# Patient Record
Sex: FEMALE | Race: OTHER | HISPANIC OR LATINO | URBAN - METROPOLITAN AREA
[De-identification: names, ages, dates, MRNs, and addresses within clinical notes are randomized per-mention and may not be internally consistent; named-entity substitution may affect disease eponyms.]

---

## 2017-02-02 VITALS
DIASTOLIC BLOOD PRESSURE: 56 MMHG | RESPIRATION RATE: 56 BRPM | HEIGHT: 61 IN | TEMPERATURE: 98 F | SYSTOLIC BLOOD PRESSURE: 91 MMHG | OXYGEN SATURATION: 99 % | WEIGHT: 130.95 LBS | HEART RATE: 91 BPM

## 2017-02-03 ENCOUNTER — INPATIENT (INPATIENT)
Facility: HOSPITAL | Age: 47
LOS: 2 days | Discharge: HOME | DRG: 743 | End: 2017-02-06
Attending: OBSTETRICS & GYNECOLOGY | Admitting: OBSTETRICS & GYNECOLOGY
Payer: COMMERCIAL

## 2017-02-03 DIAGNOSIS — Z98.82 BREAST IMPLANT STATUS: Chronic | ICD-10-CM

## 2017-02-03 LAB
BLD GP AB SCN SERPL QL: NEGATIVE — SIGNIFICANT CHANGE UP
HCT VFR BLD CALC: 37.1 % — SIGNIFICANT CHANGE UP (ref 34.5–45)
HGB BLD-MCNC: 12.1 G/DL — SIGNIFICANT CHANGE UP (ref 11.5–15.5)
MCHC RBC-ENTMCNC: 27.8 PG — SIGNIFICANT CHANGE UP (ref 27–34)
MCHC RBC-ENTMCNC: 32.6 G/DL — SIGNIFICANT CHANGE UP (ref 32–36)
MCV RBC AUTO: 85.1 FL — SIGNIFICANT CHANGE UP (ref 80–100)
PLATELET # BLD AUTO: 302 K/UL — SIGNIFICANT CHANGE UP (ref 150–400)
RBC # BLD: 4.36 M/UL — SIGNIFICANT CHANGE UP (ref 3.8–5.2)
RBC # FLD: 13.1 % — SIGNIFICANT CHANGE UP (ref 10.3–16.9)
RH IG SCN BLD-IMP: POSITIVE — SIGNIFICANT CHANGE UP
WBC # BLD: 13 K/UL — HIGH (ref 3.8–10.5)
WBC # FLD AUTO: 13 K/UL — HIGH (ref 3.8–10.5)

## 2017-02-03 RX ORDER — ONDANSETRON 8 MG/1
4 TABLET, FILM COATED ORAL EVERY 8 HOURS
Qty: 0 | Refills: 0 | Status: DISCONTINUED | OUTPATIENT
Start: 2017-02-03 | End: 2017-02-06

## 2017-02-03 RX ORDER — NALOXONE HYDROCHLORIDE 4 MG/.1ML
0.1 SPRAY NASAL
Qty: 0 | Refills: 0 | Status: DISCONTINUED | OUTPATIENT
Start: 2017-02-03 | End: 2017-02-06

## 2017-02-03 RX ORDER — METOCLOPRAMIDE HCL 10 MG
10 TABLET ORAL EVERY 6 HOURS
Qty: 0 | Refills: 0 | Status: DISCONTINUED | OUTPATIENT
Start: 2017-02-03 | End: 2017-02-06

## 2017-02-03 RX ORDER — HYDROMORPHONE HYDROCHLORIDE 2 MG/ML
30 INJECTION INTRAMUSCULAR; INTRAVENOUS; SUBCUTANEOUS
Qty: 0 | Refills: 0 | Status: DISCONTINUED | OUTPATIENT
Start: 2017-02-03 | End: 2017-02-04

## 2017-02-03 RX ORDER — HEPARIN SODIUM 5000 [USP'U]/ML
5000 INJECTION INTRAVENOUS; SUBCUTANEOUS EVERY 12 HOURS
Qty: 0 | Refills: 0 | Status: DISCONTINUED | OUTPATIENT
Start: 2017-02-03 | End: 2017-02-06

## 2017-02-03 RX ORDER — PANTOPRAZOLE SODIUM 20 MG/1
40 TABLET, DELAYED RELEASE ORAL
Qty: 0 | Refills: 0 | Status: DISCONTINUED | OUTPATIENT
Start: 2017-02-03 | End: 2017-02-04

## 2017-02-03 RX ORDER — SODIUM CHLORIDE 9 MG/ML
1000 INJECTION, SOLUTION INTRAVENOUS
Qty: 0 | Refills: 0 | Status: DISCONTINUED | OUTPATIENT
Start: 2017-02-03 | End: 2017-02-05

## 2017-02-03 RX ORDER — FAMOTIDINE 10 MG/ML
20 INJECTION INTRAVENOUS
Qty: 0 | Refills: 0 | Status: DISCONTINUED | OUTPATIENT
Start: 2017-02-03 | End: 2017-02-06

## 2017-02-03 RX ORDER — CEFAZOLIN SODIUM 1 G
2000 VIAL (EA) INJECTION EVERY 8 HOURS
Qty: 0 | Refills: 0 | Status: COMPLETED | OUTPATIENT
Start: 2017-02-03 | End: 2017-02-04

## 2017-02-03 RX ORDER — MORPHINE SULFATE 50 MG/1
4 CAPSULE, EXTENDED RELEASE ORAL
Qty: 0 | Refills: 0 | Status: DISCONTINUED | OUTPATIENT
Start: 2017-02-03 | End: 2017-02-03

## 2017-02-03 RX ORDER — SIMETHICONE 80 MG/1
80 TABLET, CHEWABLE ORAL EVERY 6 HOURS
Qty: 0 | Refills: 0 | Status: DISCONTINUED | OUTPATIENT
Start: 2017-02-03 | End: 2017-02-06

## 2017-02-03 RX ADMIN — SIMETHICONE 80 MILLIGRAM(S): 80 TABLET, CHEWABLE ORAL at 17:14

## 2017-02-03 RX ADMIN — SIMETHICONE 80 MILLIGRAM(S): 80 TABLET, CHEWABLE ORAL at 23:19

## 2017-02-03 RX ADMIN — PANTOPRAZOLE SODIUM 40 MILLIGRAM(S): 20 TABLET, DELAYED RELEASE ORAL at 21:32

## 2017-02-03 RX ADMIN — SODIUM CHLORIDE 125 MILLILITER(S): 9 INJECTION, SOLUTION INTRAVENOUS at 21:31

## 2017-02-03 RX ADMIN — Medication 100 MILLIGRAM(S): at 16:22

## 2017-02-03 RX ADMIN — Medication 100 MILLIGRAM(S): at 23:19

## 2017-02-03 RX ADMIN — HEPARIN SODIUM 5000 UNIT(S): 5000 INJECTION INTRAVENOUS; SUBCUTANEOUS at 17:14

## 2017-02-03 RX ADMIN — HYDROMORPHONE HYDROCHLORIDE 30 MILLILITER(S): 2 INJECTION INTRAMUSCULAR; INTRAVENOUS; SUBCUTANEOUS at 11:40

## 2017-02-03 RX ADMIN — MORPHINE SULFATE 4 MILLIGRAM(S): 50 CAPSULE, EXTENDED RELEASE ORAL at 11:21

## 2017-02-03 NOTE — H&P ADULT. - ASSESSMENT
46yoF presenting for FERDINAND BS, possible RANDAL for pelvic pain and menorrhagia 2/2 fibroid uterus.

## 2017-02-03 NOTE — BRIEF OPERATIVE NOTE - OPERATION/FINDINGS
6cm pedunculated fundal fibroid, multiple 1-2cm subserosal fundal, anterior, posterior fibroids.  Left tube with paratubal cyst.  Right tube wnl.  Bilateral ovaries wnl.

## 2017-02-03 NOTE — PROGRESS NOTE ADULT - SUBJECTIVE AND OBJECTIVE BOX
GYN POC    Pt seen and examined at bedside. Pt complains of mild abdominal pain.   Pt denies any fever, chills, chest pain, SOB, nausea or vomiting     T(F): 98.2, Max: 98.2 (02-03 @ 16:54)  HR: 86 (58 - 97)  BP: 119/71 (114/78 - 162/67)  RR: 16 (11 - 97)  SpO2: 99% (98% - 100%)  Wt(kg): --    I & Os for current day (as of 02-03 @ 17:22)  =============================================  IN: 0 ml / OUT: 260 ml / NET: -260 ml      heparin  Injectable 5000Unit(s) SubCutaneous every 12 hours  lactated ringers. 1000milliLiter(s) IV Continuous <Continuous>  metoclopramide Injectable 10milliGRAM(s) IV Push every 6 hours PRN Nausea and/or Vomiting  ondansetron Injectable 4milliGRAM(s) IV Push every 8 hours PRN Nausea and/or Vomiting  HYDROmorphone PCA (1 mG/mL) 30milliLiter(s) PCA Continuous PCA Continuous  naloxone Injectable 0.1milliGRAM(s) IV Push every 3 minutes PRN For ANY of the following changes in patient status:  A. RR LESS THAN 10 breaths per minute, B. Oxygen saturation LESS THAN 90%, C. Sedation score of 6  simethicone 80milliGRAM(s) Chew every 6 hours  ceFAZolin   IVPB 2000milliGRAM(s) IV Intermittent every 8 hours      Physical exam:  Constitutional: NAD  Pulmonary: clear to auscultation bilaterally  Cardiovascular: regular rate and rhythm  Abdomen: incision site clean, dry and intact. Soft, mildly tender, nondistended  Extremities: no lower extremity edema, or calve tenderness. SCDs in place    A: 45yo POD0 s/p total abdominal hysterectomy, bilateral salpingectomy for 24cm fibroid uterus. Pt is hemodynamically stable.     Plan:  1. Vital signs stable, continue to monitor per protocol  2. Pain control PCA  3. DVT prophylaxis: SCDs  4. CV: IVH   5. Pulm: Incentive spirometer (at least 10 times per hour while awake)   6. GI: Diet Clears  7. : Encarnacion   8. Follow up labs: PM CBC  9. Activity: bedrest tonight

## 2017-02-03 NOTE — H&P ADULT. - HISTORY OF PRESENT ILLNESS
Pt presents with approximately 24cm fibroid uterus, abdominopelvic pain, and menorrhagia for FERDINAND, BS, possible RANDAL.

## 2017-02-04 LAB
HCT VFR BLD CALC: 33.7 % — LOW (ref 34.5–45)
HGB BLD-MCNC: 11.1 G/DL — LOW (ref 11.5–15.5)
MCHC RBC-ENTMCNC: 28.2 PG — SIGNIFICANT CHANGE UP (ref 27–34)
MCHC RBC-ENTMCNC: 32.9 G/DL — SIGNIFICANT CHANGE UP (ref 32–36)
MCV RBC AUTO: 85.8 FL — SIGNIFICANT CHANGE UP (ref 80–100)
PLATELET # BLD AUTO: 300 K/UL — SIGNIFICANT CHANGE UP (ref 150–400)
RBC # BLD: 3.93 M/UL — SIGNIFICANT CHANGE UP (ref 3.8–5.2)
RBC # FLD: 12.9 % — SIGNIFICANT CHANGE UP (ref 10.3–16.9)
WBC # BLD: 11.7 K/UL — HIGH (ref 3.8–10.5)
WBC # FLD AUTO: 11.7 K/UL — HIGH (ref 3.8–10.5)

## 2017-02-04 RX ORDER — IBUPROFEN 200 MG
600 TABLET ORAL EVERY 6 HOURS
Qty: 0 | Refills: 0 | Status: DISCONTINUED | OUTPATIENT
Start: 2017-02-04 | End: 2017-02-06

## 2017-02-04 RX ADMIN — SIMETHICONE 80 MILLIGRAM(S): 80 TABLET, CHEWABLE ORAL at 05:47

## 2017-02-04 RX ADMIN — SIMETHICONE 80 MILLIGRAM(S): 80 TABLET, CHEWABLE ORAL at 09:16

## 2017-02-04 RX ADMIN — PANTOPRAZOLE SODIUM 40 MILLIGRAM(S): 20 TABLET, DELAYED RELEASE ORAL at 05:47

## 2017-02-04 RX ADMIN — FAMOTIDINE 20 MILLIGRAM(S): 10 INJECTION INTRAVENOUS at 18:57

## 2017-02-04 RX ADMIN — HEPARIN SODIUM 5000 UNIT(S): 5000 INJECTION INTRAVENOUS; SUBCUTANEOUS at 05:47

## 2017-02-04 RX ADMIN — HEPARIN SODIUM 5000 UNIT(S): 5000 INJECTION INTRAVENOUS; SUBCUTANEOUS at 18:57

## 2017-02-04 RX ADMIN — Medication 600 MILLIGRAM(S): at 16:08

## 2017-02-04 RX ADMIN — SODIUM CHLORIDE 125 MILLILITER(S): 9 INJECTION, SOLUTION INTRAVENOUS at 07:58

## 2017-02-04 RX ADMIN — SIMETHICONE 80 MILLIGRAM(S): 80 TABLET, CHEWABLE ORAL at 18:57

## 2017-02-04 RX ADMIN — Medication 600 MILLIGRAM(S): at 15:38

## 2017-02-04 RX ADMIN — Medication 100 MILLIGRAM(S): at 07:57

## 2017-02-04 NOTE — PROGRESS NOTE ADULT - SUBJECTIVE AND OBJECTIVE BOX
POD #1  Pt. alert and comfortable in bed. No flatus as yet. Had dizziness earlier, OK now; c/o heartburn.  Afebrile, VS normal.  Hct 33%  Abdomen soft, flat, benign.  Wound clean.  Pt. stable. Clears till flatus.  OOB.

## 2017-02-04 NOTE — PROGRESS NOTE ADULT - ASSESSMENT
46y Female POD#  1 s/p FERDINAND, BS, .  Overall doing well. Pain controlled.                                       1. Neuro/Pain:  OPM  2  CV:   VS per routine  3. Pulm: Encourage ISS  4. GI: Will advance to Reg with flatus, Pepcid PRN for reflux  5. :  Voiding  6. Heme: Stable  7. ID: --  8. DVT ppx: SCDs  9. Dispo: Likely POD#2

## 2017-02-04 NOTE — PROGRESS NOTE ADULT - SUBJECTIVE AND OBJECTIVE BOX
GYN Progress Note    Patient seen at bedside. C/o mild reflux.  Pain controlled on OPM  Tolerating clears  OOB  Voiding  -flatus    Denies CP, palpitations, SOB, fever, chills, nausea, vomiting.    Vital Signs Last 24 Hrs  T(C): 36, Max: 37.1 (02-04 @ 05:54)  T(F): 96.8, Max: 98.8 (02-04 @ 05:54)  HR: 84 (58 - 86)  BP: 108/69 (108/69 - 162/67)  BP(mean): --  RR: 16 (12 - 18)  SpO2: 97% (97% - 100%)    Physical Exam:  Gen: No Acute Distress  Pulm: Clear to auscultation bilaterally  GI: soft, nontender, mildly distended, +BS, no rebound, no guarding.  Incision C/D/I  Ext: SCDs in place, wnl    I&O's Summary  I & Os for 24h ending 04 Feb 2017 07:00  =============================================  IN: 2090 ml / OUT: 3210 ml / NET: -1120 ml    I & Os for current day (as of 04 Feb 2017 13:17)  =============================================  IN: 360 ml / OUT: 600 ml / NET: -240 ml    MEDICATIONS  (STANDING):  heparin  Injectable 5000Unit(s) SubCutaneous every 12 hours  lactated ringers. 1000milliLiter(s) IV Continuous <Continuous>  simethicone 80milliGRAM(s) Chew every 6 hours  famotidine    Tablet 20milliGRAM(s) Oral two times a day    MEDICATIONS  (PRN):  metoclopramide Injectable 10milliGRAM(s) IV Push every 6 hours PRN Nausea and/or Vomiting  ondansetron Injectable 4milliGRAM(s) IV Push every 8 hours PRN Nausea and/or Vomiting  naloxone Injectable 0.1milliGRAM(s) IV Push every 3 minutes PRN For ANY of the following changes in patient status:  A. RR LESS THAN 10 breaths per minute, B. Oxygen saturation LESS THAN 90%, C. Sedation score of 6  ibuprofen  Tablet 600milliGRAM(s) Oral every 6 hours PRN mild pain  oxyCODONE  5 mG/acetaminophen 325 mG 1Tablet(s) Oral every 4 hours PRN Moderate Pain (4 - 6)  oxyCODONE  5 mG/acetaminophen 325 mG 2Tablet(s) Oral every 4 hours PRN Severe Pain (7 - 10)    Allergies    No Known Allergies    Intolerances        LABS:                        11.1   11.7  )-----------( 300      ( 04 Feb 2017 06:25 )             33.7

## 2017-02-04 NOTE — PROGRESS NOTE ADULT - SUBJECTIVE AND OBJECTIVE BOX
Subjective: No events overnight. No flatus. tolerating sips of water. Pain controlled with PCA    Patient denies fevers, chills, chest pain, shortness of breath, nausea, vomiting, diarrhea or constipation     Vital Signs Last 24 Hrs  T(C): 37.1, Max: 37.1 (02-04 @ 05:54)  T(F): 98.8, Max: 98.8 (02-04 @ 05:54)  HR: 74 (58 - 97)  BP: 121/68 (114/78 - 162/67)  BP(mean): --  RR: 18 (11 - 97)  SpO2: 99% (98% - 100%)  I&O's Summary    I & Os for current day (as of 04 Feb 2017 07:52)  =============================================  IN: 2090 ml / OUT: 3210 ml / NET: -1120 ml    MEDICATIONS  (STANDING):  heparin  Injectable 5000Unit(s) SubCutaneous every 12 hours  lactated ringers. 1000milliLiter(s) IV Continuous <Continuous>  simethicone 80milliGRAM(s) Chew every 6 hours  ceFAZolin   IVPB 2000milliGRAM(s) IV Intermittent every 8 hours  famotidine    Tablet 20milliGRAM(s) Oral two times a day  pantoprazole    Tablet 40milliGRAM(s) Oral two times a day before meals    MEDICATIONS  (PRN):  metoclopramide Injectable 10milliGRAM(s) IV Push every 6 hours PRN Nausea and/or Vomiting  ondansetron Injectable 4milliGRAM(s) IV Push every 8 hours PRN Nausea and/or Vomiting  naloxone Injectable 0.1milliGRAM(s) IV Push every 3 minutes PRN For ANY of the following changes in patient status:  A. RR LESS THAN 10 breaths per minute, B. Oxygen saturation LESS THAN 90%, C. Sedation score of 6    PHYSICAL EXAM   Constitutional: Alert & Oriented x3, No acute distress, cooperative   Gastrointestinal: soft, mildly tender, positive bowel sounds, no rebound or guarding   Incision: dry and intact, no erythema or induration.   Genitourinary: mares in place with adequate UOP.   Extremities: no calf tenderness or swelling    LABS:                        11.1   11.7  )-----------( 300      ( 04 Feb 2017 06:25 )             33.7                 RADIOLOGY & ADDITIONAL TESTS:

## 2017-02-05 RX ORDER — IBUPROFEN 200 MG
600 TABLET ORAL THREE TIMES A DAY
Qty: 0 | Refills: 0 | Status: DISCONTINUED | OUTPATIENT
Start: 2017-02-05 | End: 2017-02-06

## 2017-02-05 RX ORDER — SODIUM CHLORIDE 9 MG/ML
1000 INJECTION, SOLUTION INTRAVENOUS
Qty: 0 | Refills: 0 | Status: DISCONTINUED | OUTPATIENT
Start: 2017-02-05 | End: 2017-02-06

## 2017-02-05 RX ADMIN — HEPARIN SODIUM 5000 UNIT(S): 5000 INJECTION INTRAVENOUS; SUBCUTANEOUS at 18:19

## 2017-02-05 RX ADMIN — Medication 10 MILLIGRAM(S): at 12:10

## 2017-02-05 RX ADMIN — SIMETHICONE 80 MILLIGRAM(S): 80 TABLET, CHEWABLE ORAL at 06:19

## 2017-02-05 RX ADMIN — Medication 600 MILLIGRAM(S): at 12:14

## 2017-02-05 RX ADMIN — FAMOTIDINE 20 MILLIGRAM(S): 10 INJECTION INTRAVENOUS at 18:19

## 2017-02-05 RX ADMIN — HEPARIN SODIUM 5000 UNIT(S): 5000 INJECTION INTRAVENOUS; SUBCUTANEOUS at 06:19

## 2017-02-05 RX ADMIN — Medication 600 MILLIGRAM(S): at 12:26

## 2017-02-05 RX ADMIN — SIMETHICONE 80 MILLIGRAM(S): 80 TABLET, CHEWABLE ORAL at 12:06

## 2017-02-05 RX ADMIN — FAMOTIDINE 20 MILLIGRAM(S): 10 INJECTION INTRAVENOUS at 06:19

## 2017-02-05 RX ADMIN — SIMETHICONE 80 MILLIGRAM(S): 80 TABLET, CHEWABLE ORAL at 01:23

## 2017-02-05 RX ADMIN — SIMETHICONE 80 MILLIGRAM(S): 80 TABLET, CHEWABLE ORAL at 18:19

## 2017-02-05 NOTE — PROGRESS NOTE ADULT - SUBJECTIVE AND OBJECTIVE BOX
GYN Progress Note    Patient seen at bedside.  Pain controlled on OPM  Tolerating clears  OOB  Voiding  -flatus    Denies CP, palpitations, SOB, fever, chills, nausea, vomiting.    Vital Signs Last 24 Hrs  T(C): 37.1, Max: 37.1 (02-05 @ 08:15)  T(F): 98.7, Max: 98.7 (02-05 @ 08:15)  HR: 64 (64 - 84)  BP: 121/67 (99/66 - 131/81)  BP(mean): --  RR: 17 (16 - 17)  SpO2: 97% (97% - 99%)    Physical Exam:  Gen: No Acute Distress  Pulm: Clear to auscultation bilaterally  GI: soft, nontender, mildly distended, +BS, no rebound, no guarding.  Incision C/D/I  Ext: SCDs in place, wnl    I&O's Summary  I & Os for 24h ending 05 Feb 2017 07:00  =============================================  IN: 960 ml / OUT: 2850 ml / NET: -1890 ml    I & Os for current day (as of 05 Feb 2017 08:52)  =============================================  IN: 0 ml / OUT: 430 ml / NET: -430 ml    MEDICATIONS  (STANDING):  heparin  Injectable 5000Unit(s) SubCutaneous every 12 hours  lactated ringers. 1000milliLiter(s) IV Continuous <Continuous>  simethicone 80milliGRAM(s) Chew every 6 hours  famotidine    Tablet 20milliGRAM(s) Oral two times a day    MEDICATIONS  (PRN):  metoclopramide Injectable 10milliGRAM(s) IV Push every 6 hours PRN Nausea and/or Vomiting  ondansetron Injectable 4milliGRAM(s) IV Push every 8 hours PRN Nausea and/or Vomiting  naloxone Injectable 0.1milliGRAM(s) IV Push every 3 minutes PRN For ANY of the following changes in patient status:  A. RR LESS THAN 10 breaths per minute, B. Oxygen saturation LESS THAN 90%, C. Sedation score of 6  ibuprofen  Tablet 600milliGRAM(s) Oral every 6 hours PRN mild pain  oxyCODONE  5 mG/acetaminophen 325 mG 1Tablet(s) Oral every 4 hours PRN Moderate Pain (4 - 6)  oxyCODONE  5 mG/acetaminophen 325 mG 2Tablet(s) Oral every 4 hours PRN Severe Pain (7 - 10)    Allergies    No Known Allergies    Intolerances        LABS:                        11.1   11.7  )-----------( 300      ( 04 Feb 2017 06:25 )             33.7

## 2017-02-05 NOTE — PROGRESS NOTE ADULT - ASSESSMENT
47 y/o POD2 after FERDINAND and BS   1. Neuro: Pain controlled with PO pain meds  2. CV:  LR @ 75cc/hr   3. Pulm: Patient encouraged to use incentive spirometer when awake. No issues   4. GI: No flatus. Clears until flatus.   5. : voiding   6. DVT: SCDs and early ambulation

## 2017-02-05 NOTE — PROGRESS NOTE ADULT - ASSESSMENT
46y Female POD#  2  s/p  FERDINAND, BS. Doing well, advance to reg pending flatus                                      1. Neuro/Pain:  OPM  2  CV:   VS per routine  3. Pulm: Encourage ISS  4. GI: Reg with flatus  5. :  Voiding  6. Heme: Stable  7. ID: --  8. DVT ppx: SCDs  9. Dispo: Likely POD#2

## 2017-02-05 NOTE — PROGRESS NOTE ADULT - SUBJECTIVE AND OBJECTIVE BOX
POD #2  C/o abdominal pain and dizziness  No flatus as yet however feels it moving inside.  Hungry.  Afebrile, VS normal, P64, BP normal  Abdomen soft flat, benign.  BS+, normal.  Wound clean.  Hct. 33%  Pt. stable, mild ileus, feel patient will pass gas imminently.  Dulcolax supp. x 1 this am; .Clears till flatus then reg. diet.  Decrease Percocet in favor of Motrin 600mg.  Anticipate d/c home tomorrow am.    Danny Noel M.D.

## 2017-02-05 NOTE — PROGRESS NOTE ADULT - SUBJECTIVE AND OBJECTIVE BOX
Subjective: Patient is doing well this afternoon. She continues to complain of abdominal bloating, but no flatus. Pt is tolerating clears without issue. s/p suppository x1. Pt is voiding. Pt is ambulating without issue.     Patient denies fevers, chills, chest pain, shortness of breath, nausea, vomiting, diarrhea or constipation     Vital Signs Last 24 Hrs  T(C): 37.1, Max: 37.1 (02-05 @ 08:15)  T(F): 98.7, Max: 98.7 (02-05 @ 08:15)  HR: 64 (64 - 69)  BP: 121/67 (99/66 - 131/81)  BP(mean): --  RR: 17 (16 - 17)  SpO2: 97% (97% - 99%)  I&O's Summary  I & Os for 24h ending 05 Feb 2017 07:00  =============================================  IN: 960 ml / OUT: 2850 ml / NET: -1890 ml    I & Os for current day (as of 05 Feb 2017 14:20)  =============================================  IN: 735 ml / OUT: 830 ml / NET: -95 ml    MEDICATIONS  (STANDING):  heparin  Injectable 5000Unit(s) SubCutaneous every 12 hours  lactated ringers. 1000milliLiter(s) IV Continuous <Continuous>  simethicone 80milliGRAM(s) Chew every 6 hours  famotidine    Tablet 20milliGRAM(s) Oral two times a day    MEDICATIONS  (PRN):  metoclopramide Injectable 10milliGRAM(s) IV Push every 6 hours PRN Nausea and/or Vomiting  ondansetron Injectable 4milliGRAM(s) IV Push every 8 hours PRN Nausea and/or Vomiting  naloxone Injectable 0.1milliGRAM(s) IV Push every 3 minutes PRN For ANY of the following changes in patient status:  A. RR LESS THAN 10 breaths per minute, B. Oxygen saturation LESS THAN 90%, C. Sedation score of 6  ibuprofen  Tablet 600milliGRAM(s) Oral every 6 hours PRN mild pain  oxyCODONE  5 mG/acetaminophen 325 mG 1Tablet(s) Oral every 4 hours PRN Moderate Pain (4 - 6)  oxyCODONE  5 mG/acetaminophen 325 mG 2Tablet(s) Oral every 4 hours PRN Severe Pain (7 - 10)  ibuprofen  Tablet 600milliGRAM(s) Oral three times a day PRN cutting down on Percocet, take as needed first for mild pain      PHYSICAL EXAM   Constitutional: Alert & Oriented x3, No acute distress, cooperative   Gastrointestinal: soft, moderate abdominal distention, + bowel sounds, mild tendnerness to deep palpation, no rebound or guarding   Incision: dry and intact, no erythema or induration.   Extremities: no calf tenderness or swelling    LABS:                        11.1   11.7  )-----------( 300      ( 04 Feb 2017 06:25 )             33.7

## 2017-02-06 VITALS
TEMPERATURE: 99 F | DIASTOLIC BLOOD PRESSURE: 89 MMHG | HEART RATE: 66 BPM | SYSTOLIC BLOOD PRESSURE: 137 MMHG | RESPIRATION RATE: 16 BRPM | OXYGEN SATURATION: 98 %

## 2017-02-06 LAB — SURGICAL PATHOLOGY STUDY: SIGNIFICANT CHANGE UP

## 2017-02-06 PROCEDURE — 86900 BLOOD TYPING SEROLOGIC ABO: CPT

## 2017-02-06 PROCEDURE — 88307 TISSUE EXAM BY PATHOLOGIST: CPT

## 2017-02-06 PROCEDURE — 85027 COMPLETE CBC AUTOMATED: CPT

## 2017-02-06 PROCEDURE — 88304 TISSUE EXAM BY PATHOLOGIST: CPT

## 2017-02-06 PROCEDURE — 36415 COLL VENOUS BLD VENIPUNCTURE: CPT

## 2017-02-06 PROCEDURE — 88305 TISSUE EXAM BY PATHOLOGIST: CPT

## 2017-02-06 PROCEDURE — 86850 RBC ANTIBODY SCREEN: CPT

## 2017-02-06 PROCEDURE — 86901 BLOOD TYPING SEROLOGIC RH(D): CPT

## 2017-02-06 RX ORDER — METOPROLOL TARTRATE 50 MG
1 TABLET ORAL
Qty: 0 | Refills: 0 | COMMUNITY

## 2017-02-06 RX ADMIN — Medication 600 MILLIGRAM(S): at 09:35

## 2017-02-06 RX ADMIN — FAMOTIDINE 20 MILLIGRAM(S): 10 INJECTION INTRAVENOUS at 05:38

## 2017-02-06 RX ADMIN — HEPARIN SODIUM 5000 UNIT(S): 5000 INJECTION INTRAVENOUS; SUBCUTANEOUS at 05:38

## 2017-02-06 RX ADMIN — SODIUM CHLORIDE 75 MILLILITER(S): 9 INJECTION, SOLUTION INTRAVENOUS at 05:38

## 2017-02-06 RX ADMIN — SIMETHICONE 80 MILLIGRAM(S): 80 TABLET, CHEWABLE ORAL at 05:37

## 2017-02-06 NOTE — DISCHARGE NOTE ADULT - CARE PLAN
Principal Discharge DX:	Postoperative state  Goal:	Uncomplicated post-operative recovery  Instructions for follow-up, activity and diet:	f/u with Dr. Noel in 2 weeks, regular diet, ambulate routinely without strenuous activity or heavy lifting, nothing per vagina (sex, tampons etc...) for 6 weeks.  Call Dr. Noel or go to ER if persistent T>100.4 despite tylenol, extreme pain, excessive bleeding or foul smelling discharge per vagina  Secondary Diagnosis:	Subserous leiomyoma of uterus

## 2017-02-06 NOTE — DISCHARGE NOTE ADULT - PATIENT PORTAL LINK FT
“You can access the [facility] Patient Portal, offered by [facility], by registering with the following website: [email or web address]”

## 2017-02-06 NOTE — DISCHARGE NOTE ADULT - PLAN OF CARE
Uncomplicated post-operative recovery f/u with Dr. Noel in 2 weeks, regular diet, ambulate routinely without strenuous activity or heavy lifting, nothing per vagina (sex, tampons etc...) for 6 weeks.  Call Dr. Noel or go to ER if persistent T>100.4 despite tylenol, extreme pain, excessive bleeding or foul smelling discharge per vagina

## 2017-02-06 NOTE — PROGRESS NOTE ADULT - SUBJECTIVE AND OBJECTIVE BOX
POD #3  +flatus, tolerating diet.  Afebrile, VS normal  c/o dizziness after Percocet  Abdomen and wound benign, soft, flat.  Pt. is stable and OK for d/c home.  Instructions given.   MIGUEL Noel M.D.

## 2017-02-06 NOTE — PROGRESS NOTE ADULT - SUBJECTIVE AND OBJECTIVE BOX
At bedside POD3 am s/p FERDINAND BS.  Pt doing very well.  Pain well controlled.  +flatus.  Tolerating regular diet.  Ambulating without difficulty.  Denies n/v/d, fever, chills, chest pain, sob.    Vital Signs Last 24 Hrs  T(C): 37.4, Max: 37.6 (02-05 @ 20:40)  T(F): 99.4, Max: 99.6 (02-05 @ 20:40)  HR: 74 (66 - 80)  BP: 121/79 (112/73 - 135/78)  BP(mean): --  RR: 17 (15 - 17)  SpO2: 98% (97% - 98%)  I&O's Summary  I & Os for 24h ending 05 Feb 2017 07:00  =============================================  IN: 960 ml / OUT: 2850 ml / NET: -1890 ml    I & Os for current day (as of 06 Feb 2017 05:52)  =============================================  IN: 2195 ml / OUT: 2430 ml / NET: -235 ml    MEDICATIONS  (STANDING):  heparin  Injectable 5000Unit(s) SubCutaneous every 12 hours  simethicone 80milliGRAM(s) Chew every 6 hours  famotidine    Tablet 20milliGRAM(s) Oral two times a day  lactated ringers. 1000milliLiter(s) IV Continuous <Continuous>    MEDICATIONS  (PRN):  metoclopramide Injectable 10milliGRAM(s) IV Push every 6 hours PRN Nausea and/or Vomiting  ondansetron Injectable 4milliGRAM(s) IV Push every 8 hours PRN Nausea and/or Vomiting  naloxone Injectable 0.1milliGRAM(s) IV Push every 3 minutes PRN For ANY of the following changes in patient status:  A. RR LESS THAN 10 breaths per minute, B. Oxygen saturation LESS THAN 90%, C. Sedation score of 6  ibuprofen  Tablet 600milliGRAM(s) Oral every 6 hours PRN mild pain  oxyCODONE  5 mG/acetaminophen 325 mG 1Tablet(s) Oral every 4 hours PRN Moderate Pain (4 - 6)  oxyCODONE  5 mG/acetaminophen 325 mG 2Tablet(s) Oral every 4 hours PRN Severe Pain (7 - 10)  ibuprofen  Tablet 600milliGRAM(s) Oral three times a day PRN cutting down on Percocet, take as needed first for mild pain    PHYSICAL EXAM:    A&Ox3, NAD  Abd s/nt/nd, incision cdi  CTAB, RRR  Ext no swelling or calf tenderness    LABS:                        11.1   11.7  )-----------( 300      ( 04 Feb 2017 06:25 )             33.7     A/P: POD3 s/p FERDINAND, BLADE.  Pt met all milestones and stable for discharge today.  - Reg diet  - OPM  - Ambulate as desired  - SCDs in bed/ incentive spirometry encouraged  - Dispo: POD3 pending attending approval    Lupe PGY3

## 2017-02-06 NOTE — DISCHARGE NOTE ADULT - HOSPITAL COURSE
Pt underwent FERDINAND, BS for 24cm fibroid uterus.  Uncomplicated procedure and post-operative course, with advancement to regular diet, ambulation, and appropriate pain control.  Stable and ready for discharge POD3.

## 2017-02-06 NOTE — DISCHARGE NOTE ADULT - CARE PROVIDER_API CALL
Danny Noel (MD), Obstetrics and Gynecology  [address] [place] [address]  Phone: [phone]  Fax: [phone]

## 2017-02-09 DIAGNOSIS — D25.0 SUBMUCOUS LEIOMYOMA OF UTERUS: ICD-10-CM

## 2017-02-09 DIAGNOSIS — I10 ESSENTIAL (PRIMARY) HYPERTENSION: ICD-10-CM

## 2017-02-09 DIAGNOSIS — D25.9 LEIOMYOMA OF UTERUS, UNSPECIFIED: ICD-10-CM

## 2020-09-22 PROBLEM — N92.0 EXCESSIVE AND FREQUENT MENSTRUATION WITH REGULAR CYCLE: Chronic | Status: ACTIVE | Noted: 2017-02-02

## 2020-09-22 PROBLEM — I10 ESSENTIAL (PRIMARY) HYPERTENSION: Chronic | Status: ACTIVE | Noted: 2017-02-02

## 2020-09-22 PROBLEM — D25.9 LEIOMYOMA OF UTERUS, UNSPECIFIED: Chronic | Status: ACTIVE | Noted: 2017-02-02

## 2020-10-07 PROBLEM — Z00.00 ENCOUNTER FOR PREVENTIVE HEALTH EXAMINATION: Status: ACTIVE | Noted: 2020-10-07

## 2020-10-08 ENCOUNTER — RESULT REVIEW (OUTPATIENT)
Age: 50
End: 2020-10-08

## 2020-10-08 ENCOUNTER — APPOINTMENT (OUTPATIENT)
Dept: MAMMOGRAPHY | Facility: HOSPITAL | Age: 50
End: 2020-10-08
Payer: COMMERCIAL

## 2020-10-08 ENCOUNTER — OUTPATIENT (OUTPATIENT)
Dept: OUTPATIENT SERVICES | Facility: HOSPITAL | Age: 50
LOS: 1 days | End: 2020-10-08
Payer: COMMERCIAL

## 2020-10-08 DIAGNOSIS — Z98.82 BREAST IMPLANT STATUS: Chronic | ICD-10-CM

## 2020-10-08 PROCEDURE — 19081 BX BREAST 1ST LESION STRTCTC: CPT | Mod: LT

## 2020-10-08 PROCEDURE — 19081 BX BREAST 1ST LESION STRTCTC: CPT

## 2020-10-08 PROCEDURE — 88305 TISSUE EXAM BY PATHOLOGIST: CPT | Mod: 26

## 2020-10-08 PROCEDURE — 88305 TISSUE EXAM BY PATHOLOGIST: CPT

## 2020-10-08 PROCEDURE — A4648: CPT

## 2020-10-08 PROCEDURE — 77065 DX MAMMO INCL CAD UNI: CPT | Mod: 26,LT

## 2020-10-08 PROCEDURE — 77065 DX MAMMO INCL CAD UNI: CPT

## 2020-10-09 LAB — SURGICAL PATHOLOGY STUDY: SIGNIFICANT CHANGE UP

## 2021-12-06 ENCOUNTER — TRANSCRIPTION ENCOUNTER (OUTPATIENT)
Age: 51
End: 2021-12-06
